# Patient Record
Sex: FEMALE | URBAN - METROPOLITAN AREA
[De-identification: names, ages, dates, MRNs, and addresses within clinical notes are randomized per-mention and may not be internally consistent; named-entity substitution may affect disease eponyms.]

---

## 2022-04-15 ENCOUNTER — OFFICE VISIT (OUTPATIENT)
Dept: URBAN - METROPOLITAN AREA CLINIC 18 | Facility: CLINIC | Age: 32
End: 2022-04-15
Payer: COMMERCIAL

## 2022-04-15 DIAGNOSIS — H01.004 BLEPHARITIS OF LEFT UPPER EYELID: Primary | ICD-10-CM

## 2022-04-15 DIAGNOSIS — H01.001 BLEPHARITIS OF RIGHT UPPER EYELID: ICD-10-CM

## 2022-04-15 DIAGNOSIS — H04.123 DRY EYE SYNDROME OF BILATERAL LACRIMAL GLANDS: ICD-10-CM

## 2022-04-15 PROCEDURE — 99203 OFFICE O/P NEW LOW 30 MIN: CPT | Performed by: OPTOMETRIST

## 2022-04-15 NOTE — IMPRESSION/PLAN
Impression: Blepharitis of left upper eyelid: H01.004. Plan: blepharitis of upper eyelid accounts for pt complaint, recommend Occusoft Hypochlor nightly to help with build up that is causing irritation. Keep lids clean, RTC PRN if symptoms don't improve.

## 2022-04-15 NOTE — IMPRESSION/PLAN
Impression: Dry eye syndrome of bilateral lacrimal glands: H04.123. Plan: Discussed diagnosis with patient. Recommend artificial tears in OU for comfort, 3-4x's a day. Gave sample of Refresh Digital and recommended Pataday or Alaway for allergies.

## 2022-04-15 NOTE — IMPRESSION/PLAN
Impression: Blepharitis of right upper eyelid: H01.001. Plan: blepharitis of upper eyelid accounts for pt complaint, recommend Occusoft Hypochlor nightly to help with build up that is causing irritation. Keep lids clean, RTC PRN if symptoms don't improve.